# Patient Record
Sex: MALE | Race: WHITE
[De-identification: names, ages, dates, MRNs, and addresses within clinical notes are randomized per-mention and may not be internally consistent; named-entity substitution may affect disease eponyms.]

---

## 2020-07-04 ENCOUNTER — HOSPITAL ENCOUNTER (EMERGENCY)
Dept: HOSPITAL 61 - ER | Age: 20
Discharge: HOME | End: 2020-07-04
Payer: COMMERCIAL

## 2020-07-04 VITALS — DIASTOLIC BLOOD PRESSURE: 90 MMHG | SYSTOLIC BLOOD PRESSURE: 153 MMHG

## 2020-07-04 VITALS — BODY MASS INDEX: 31.44 KG/M2 | HEIGHT: 73 IN | WEIGHT: 237.22 LBS
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
## 2020-07-04 DIAGNOSIS — M25.561: Primary | ICD-10-CM
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
## 2020-07-04 PROCEDURE — 99283 EMERGENCY DEPT VISIT LOW MDM: CPT

## 2020-07-04 PROCEDURE — 29505 APPLICATION LONG LEG SPLINT: CPT

## 2020-07-04 PROCEDURE — 73564 X-RAY EXAM KNEE 4 OR MORE: CPT

## 2020-07-04 NOTE — PHYS DOC
Past Medical History


Past Medical History:  No Pertinent History


Past Surgical History:  No Surgical History


Smoking Status:  Never Smoker


Alcohol Use:  None





General Adult


EDM:


Chief Complaint:  KNEE INJURY





HPI:


HPI:





Patient is a 20  year old male who presents with R knee pain. Patient states he 

was pushing carts at work and felt his knee pop out of place and then popped 

back into place. He states he thought it was the knee cap. He has been having 

pain since that happened. He is able to walk since it happened.





Review of Systems:


Review of Systems:


general: Denies fever, chills, sweats, fatigue


Eyes: Denies drainage, blurred vision, eye redness


HENT: Denies rhinorrhea, sore throat, earache


Respiratory: Denies cough, shortness of breath, wheezing


Cardiac: Denies edema, palpitations, chest pain


GI: Denies abdominal pain, Nausea, vomiting


MSK: Denies back pain, neck pain


Skin: Denies rash, jaundice


Neuro: Denies headache, dizziness


Psychiatric: Denies SI/HI





Heart Score:


Risk Factors:


Risk Factors:  DM, Current or recent (<one month) smoker, HTN, HLP, family 

history of CAD, obesity.


Risk Scores:


Score 0 - 3:  2.5% MACE over next 6 weeks - Discharge Home


Score 4 - 6:  20.3% MACE over next 6 weeks - Admit for Clinical Observation


Score 7 - 10:  72.7% MACE over next 6 weeks - Early Invasive Strategies





Allergies:


Allergies:





Allergies








Coded Allergies Type Severity Reaction Last Updated Verified


 


  No Known Drug Allergies    7/4/20 No











Physical Exam:


PE:


General: Awake, alert, NAD. Well Nourished, well hydrated. Cooperative


HEENT: Atraumatic, EOMI, PERRL, airway patent, moist oral mucosa


Neck: Supple, trachea midline


Respiratory: CTA bilaterally, normal effort, no wheezing/crackles


CV: RRR, no murmur, cap refill <2


GI: Soft, nondistended, nontender, no masses


MSK: No obvious deformities, mild decreased range of motion of right knee due to

pain, tenderness along the patella, no obvious swelling or deformity


Skin: Warm, dry, intact


Neuro: A&O x3, speech NL, sensory and motor grossly intact, no focal deficits


Psych: Normal affect, normal mood, not suicidal or homicidal





Current Patient Data:


Vital Signs:





                                   Vital Signs








  Date Time  Temp Pulse Resp B/P (MAP) Pulse Ox O2 Delivery O2 Flow Rate FiO2


 


7/4/20 10:50 98.7 62 16 153/90 (111) 97 Room Air  





 98.7       











EKG:


EKG:


[]





Radiology/Procedures:


Radiology/Procedures:


[]





Course & Med Decision Making:


Course & Med Decision Making


Pertinent Labs and Imaging studies reviewed. (See chart for details)





Patient presents with knee pain. He likely had a patella dislocation and reduced

 on its own. Xray normal. Patient will be placed in immobilizer and will follow 

up with ortho.  Patient's test results and vitals while in the ED were fully 

reviewed and discussed with the patient. Patient is stable and at this time does

 not need admission to the hospital. We have discussed strict return precautions

 and the importance of following up with their Primary Care Physician. Patient 

stated understanding and was given an opportunity to ask any questions. Patient 

is in agreement with plan.





Dragon Disclaimer:


Dragon Disclaimer:


This electronic medical record was generated, in whole or in part, using a voice

 recognition dictation system.





Departure


Departure


Impression:  


   Primary Impression:  


   Knee pain


Disposition:  01 HOME, SELF-CARE


Condition:  STABLE


Referrals:  


NO PCP (PCP)








NILTON SHAH MD


Patient Instructions:  Knee Pain, Easy-to-Read





Justicifation of Admission Dx:


Justifications for Admission:


Justification of Admission Dx:  No











MAXWELL SOTO MD               Jul 4, 2020 12:02

## 2020-07-04 NOTE — RAD
Examination: KNEE RIGHT 4V

 

History: Reason: knee injury, pt states having hx of knee popping out of 

place and back. /  

 

Comparison/Correlation: None

 

Findings: 4 images of the right knee were obtained. Joint spaces are 

adequate. No acute fracture or bone destruction. Mild contour deformity of

the lateral femoral condyle articular aspect is most likely developmental.

Soft tissues are unremarkable. No definite joint effusion. No definite 

degenerative change.

 

 

Impression:

No acute process. No definite suspicious process.

 

Electronically signed by: Enmanuel Villa MD (7/4/2020 11:37 AM) [id number]